# Patient Record
Sex: MALE | Race: WHITE | Employment: FULL TIME | URBAN - NONMETROPOLITAN AREA
[De-identification: names, ages, dates, MRNs, and addresses within clinical notes are randomized per-mention and may not be internally consistent; named-entity substitution may affect disease eponyms.]

---

## 2017-02-06 PROBLEM — M67.441 GANGLION OF FLEXOR TENDON SHEATH OF RIGHT MIDDLE FINGER: Status: ACTIVE | Noted: 2017-02-06

## 2017-02-07 ENCOUNTER — ANESTHESIA (OUTPATIENT)
Dept: OPERATING ROOM | Age: 32
End: 2017-02-07
Payer: COMMERCIAL

## 2017-02-07 ENCOUNTER — HOSPITAL ENCOUNTER (OUTPATIENT)
Age: 32
Setting detail: OUTPATIENT SURGERY
Discharge: HOME | End: 2017-02-07
Attending: ORTHOPAEDIC SURGERY | Admitting: ORTHOPAEDIC SURGERY
Payer: COMMERCIAL

## 2017-02-07 ENCOUNTER — SURGERY (OUTPATIENT)
Age: 32
End: 2017-02-07

## 2017-02-07 ENCOUNTER — ANESTHESIA EVENT (OUTPATIENT)
Dept: OPERATING ROOM | Age: 32
End: 2017-02-07
Payer: COMMERCIAL

## 2017-02-07 VITALS
OXYGEN SATURATION: 100 % | RESPIRATION RATE: 8 BRPM | TEMPERATURE: 98.6 F | SYSTOLIC BLOOD PRESSURE: 83 MMHG | DIASTOLIC BLOOD PRESSURE: 42 MMHG

## 2017-02-07 VITALS
RESPIRATION RATE: 12 BRPM | SYSTOLIC BLOOD PRESSURE: 119 MMHG | DIASTOLIC BLOOD PRESSURE: 68 MMHG | WEIGHT: 190 LBS | BODY MASS INDEX: 27.2 KG/M2 | OXYGEN SATURATION: 96 % | HEIGHT: 70 IN | TEMPERATURE: 97.4 F | HEART RATE: 80 BPM

## 2017-02-07 PROCEDURE — 3700000001 HC ADD 15 MINUTES (ANESTHESIA): Performed by: ORTHOPAEDIC SURGERY

## 2017-02-07 PROCEDURE — 3700000000 HC ANESTHESIA ATTENDED CARE: Performed by: ORTHOPAEDIC SURGERY

## 2017-02-07 PROCEDURE — 6360000002 HC RX W HCPCS: Performed by: ORTHOPAEDIC SURGERY

## 2017-02-07 PROCEDURE — 3600000013 HC SURGERY LEVEL 3 ADDTL 15MIN: Performed by: ORTHOPAEDIC SURGERY

## 2017-02-07 PROCEDURE — 2720000001 HC MISC SURG SUPPLY STERILE $51-500: Performed by: ORTHOPAEDIC SURGERY

## 2017-02-07 PROCEDURE — 2500000003 HC RX 250 WO HCPCS: Performed by: NURSE ANESTHETIST, CERTIFIED REGISTERED

## 2017-02-07 PROCEDURE — 2500000003 HC RX 250 WO HCPCS: Performed by: ORTHOPAEDIC SURGERY

## 2017-02-07 PROCEDURE — 2580000003 HC RX 258: Performed by: ORTHOPAEDIC SURGERY

## 2017-02-07 PROCEDURE — 3600000003 HC SURGERY LEVEL 3 BASE: Performed by: ORTHOPAEDIC SURGERY

## 2017-02-07 PROCEDURE — 88304 TISSUE EXAM BY PATHOLOGIST: CPT

## 2017-02-07 PROCEDURE — 7100000011 HC PHASE II RECOVERY - ADDTL 15 MIN: Performed by: ORTHOPAEDIC SURGERY

## 2017-02-07 PROCEDURE — 7100000010 HC PHASE II RECOVERY - FIRST 15 MIN: Performed by: ORTHOPAEDIC SURGERY

## 2017-02-07 PROCEDURE — 6360000002 HC RX W HCPCS: Performed by: NURSE ANESTHETIST, CERTIFIED REGISTERED

## 2017-02-07 PROCEDURE — 2720000003 HC MISC SUTURE/STAPLES/RELOADS/ETC: Performed by: ORTHOPAEDIC SURGERY

## 2017-02-07 PROCEDURE — 7100000001 HC PACU RECOVERY - ADDTL 15 MIN: Performed by: ORTHOPAEDIC SURGERY

## 2017-02-07 PROCEDURE — 7100000000 HC PACU RECOVERY - FIRST 15 MIN: Performed by: ORTHOPAEDIC SURGERY

## 2017-02-07 RX ORDER — LABETALOL HYDROCHLORIDE 5 MG/ML
5 INJECTION, SOLUTION INTRAVENOUS EVERY 10 MIN PRN
Status: DISCONTINUED | OUTPATIENT
Start: 2017-02-07 | End: 2017-02-07 | Stop reason: HOSPADM

## 2017-02-07 RX ORDER — HYDRALAZINE HYDROCHLORIDE 20 MG/ML
5 INJECTION INTRAMUSCULAR; INTRAVENOUS EVERY 10 MIN PRN
Status: DISCONTINUED | OUTPATIENT
Start: 2017-02-07 | End: 2017-02-07 | Stop reason: HOSPADM

## 2017-02-07 RX ORDER — MORPHINE SULFATE 4 MG/ML
2 INJECTION, SOLUTION INTRAMUSCULAR; INTRAVENOUS EVERY 5 MIN PRN
Status: DISCONTINUED | OUTPATIENT
Start: 2017-02-07 | End: 2017-02-07 | Stop reason: HOSPADM

## 2017-02-07 RX ORDER — MIDAZOLAM HYDROCHLORIDE 1 MG/ML
INJECTION INTRAMUSCULAR; INTRAVENOUS PRN
Status: DISCONTINUED | OUTPATIENT
Start: 2017-02-07 | End: 2017-02-07 | Stop reason: SDUPTHER

## 2017-02-07 RX ORDER — SODIUM CHLORIDE, SODIUM LACTATE, POTASSIUM CHLORIDE, CALCIUM CHLORIDE 600; 310; 30; 20 MG/100ML; MG/100ML; MG/100ML; MG/100ML
INJECTION, SOLUTION INTRAVENOUS CONTINUOUS
Status: DISCONTINUED | OUTPATIENT
Start: 2017-02-07 | End: 2017-02-07 | Stop reason: HOSPADM

## 2017-02-07 RX ORDER — FENTANYL CITRATE 50 UG/ML
INJECTION, SOLUTION INTRAMUSCULAR; INTRAVENOUS PRN
Status: DISCONTINUED | OUTPATIENT
Start: 2017-02-07 | End: 2017-02-07 | Stop reason: SDUPTHER

## 2017-02-07 RX ORDER — MORPHINE SULFATE 4 MG/ML
4 INJECTION, SOLUTION INTRAMUSCULAR; INTRAVENOUS EVERY 5 MIN PRN
Status: DISCONTINUED | OUTPATIENT
Start: 2017-02-07 | End: 2017-02-07 | Stop reason: HOSPADM

## 2017-02-07 RX ORDER — PROPOFOL 10 MG/ML
INJECTION, EMULSION INTRAVENOUS PRN
Status: DISCONTINUED | OUTPATIENT
Start: 2017-02-07 | End: 2017-02-07 | Stop reason: SDUPTHER

## 2017-02-07 RX ORDER — ACETAMINOPHEN AND CODEINE PHOSPHATE 300; 30 MG/1; MG/1
TABLET ORAL
Qty: 15 TABLET | Refills: 0 | Status: SHIPPED | OUTPATIENT
Start: 2017-02-07 | End: 2020-08-22

## 2017-02-07 RX ORDER — LIDOCAINE HYDROCHLORIDE 10 MG/ML
INJECTION, SOLUTION INFILTRATION; PERINEURAL PRN
Status: DISCONTINUED | OUTPATIENT
Start: 2017-02-07 | End: 2017-02-07 | Stop reason: SDUPTHER

## 2017-02-07 RX ORDER — MEPERIDINE HYDROCHLORIDE 25 MG/ML
12.5 INJECTION INTRAMUSCULAR; INTRAVENOUS; SUBCUTANEOUS EVERY 5 MIN PRN
Status: DISCONTINUED | OUTPATIENT
Start: 2017-02-07 | End: 2017-02-07 | Stop reason: HOSPADM

## 2017-02-07 RX ORDER — LIDOCAINE HYDROCHLORIDE 10 MG/ML
1 INJECTION, SOLUTION EPIDURAL; INFILTRATION; INTRACAUDAL; PERINEURAL ONCE
Status: COMPLETED | OUTPATIENT
Start: 2017-02-07 | End: 2017-02-07

## 2017-02-07 RX ORDER — OXYCODONE HYDROCHLORIDE AND ACETAMINOPHEN 5; 325 MG/1; MG/1
2 TABLET ORAL PRN
Status: DISCONTINUED | OUTPATIENT
Start: 2017-02-07 | End: 2017-02-07 | Stop reason: HOSPADM

## 2017-02-07 RX ORDER — PROMETHAZINE HYDROCHLORIDE 25 MG/ML
6.25 INJECTION, SOLUTION INTRAMUSCULAR; INTRAVENOUS
Status: DISCONTINUED | OUTPATIENT
Start: 2017-02-07 | End: 2017-02-07 | Stop reason: HOSPADM

## 2017-02-07 RX ORDER — METOCLOPRAMIDE HYDROCHLORIDE 5 MG/ML
10 INJECTION INTRAMUSCULAR; INTRAVENOUS
Status: DISCONTINUED | OUTPATIENT
Start: 2017-02-07 | End: 2017-02-07 | Stop reason: HOSPADM

## 2017-02-07 RX ORDER — KETOROLAC TROMETHAMINE 30 MG/ML
INJECTION, SOLUTION INTRAMUSCULAR; INTRAVENOUS PRN
Status: DISCONTINUED | OUTPATIENT
Start: 2017-02-07 | End: 2017-02-07 | Stop reason: SDUPTHER

## 2017-02-07 RX ORDER — ONDANSETRON 2 MG/ML
INJECTION INTRAMUSCULAR; INTRAVENOUS PRN
Status: DISCONTINUED | OUTPATIENT
Start: 2017-02-07 | End: 2017-02-07 | Stop reason: SDUPTHER

## 2017-02-07 RX ORDER — DIPHENHYDRAMINE HYDROCHLORIDE 50 MG/ML
12.5 INJECTION INTRAMUSCULAR; INTRAVENOUS
Status: DISCONTINUED | OUTPATIENT
Start: 2017-02-07 | End: 2017-02-07 | Stop reason: HOSPADM

## 2017-02-07 RX ORDER — DEXAMETHASONE SODIUM PHOSPHATE 4 MG/ML
INJECTION, SOLUTION INTRA-ARTICULAR; INTRALESIONAL; INTRAMUSCULAR; INTRAVENOUS; SOFT TISSUE PRN
Status: DISCONTINUED | OUTPATIENT
Start: 2017-02-07 | End: 2017-02-07 | Stop reason: SDUPTHER

## 2017-02-07 RX ORDER — MORPHINE SULFATE 10 MG/ML
INJECTION, SOLUTION INTRAMUSCULAR; INTRAVENOUS PRN
Status: DISCONTINUED | OUTPATIENT
Start: 2017-02-07 | End: 2017-02-07 | Stop reason: SDUPTHER

## 2017-02-07 RX ORDER — OXYCODONE HYDROCHLORIDE AND ACETAMINOPHEN 5; 325 MG/1; MG/1
1 TABLET ORAL PRN
Status: DISCONTINUED | OUTPATIENT
Start: 2017-02-07 | End: 2017-02-07 | Stop reason: HOSPADM

## 2017-02-07 RX ADMIN — LIDOCAINE HYDROCHLORIDE 1 ML: 10 INJECTION, SOLUTION EPIDURAL; INFILTRATION; INTRACAUDAL; PERINEURAL at 09:42

## 2017-02-07 RX ADMIN — LIDOCAINE HYDROCHLORIDE 5 ML: 10 INJECTION, SOLUTION INFILTRATION; PERINEURAL at 10:38

## 2017-02-07 RX ADMIN — ONDANSETRON HYDROCHLORIDE 4 MG: 2 INJECTION, SOLUTION INTRAVENOUS at 10:44

## 2017-02-07 RX ADMIN — MORPHINE SULFATE 5 MG: 10 INJECTION INTRAMUSCULAR; INTRAVENOUS; SUBCUTANEOUS at 10:33

## 2017-02-07 RX ADMIN — DEXAMETHASONE SODIUM PHOSPHATE 4 MG: 4 INJECTION, SOLUTION INTRAMUSCULAR; INTRAVENOUS at 10:44

## 2017-02-07 RX ADMIN — FENTANYL CITRATE 25 MCG: 50 INJECTION INTRAMUSCULAR; INTRAVENOUS at 10:45

## 2017-02-07 RX ADMIN — CEFAZOLIN SODIUM 1 G: 1 INJECTION, SOLUTION INTRAVENOUS at 10:35

## 2017-02-07 RX ADMIN — KETOROLAC TROMETHAMINE 30 MG: 30 INJECTION, SOLUTION INTRAMUSCULAR at 10:58

## 2017-02-07 RX ADMIN — FENTANYL CITRATE 25 MCG: 50 INJECTION INTRAMUSCULAR; INTRAVENOUS at 10:43

## 2017-02-07 RX ADMIN — PROPOFOL 170 MG: 10 INJECTION, EMULSION INTRAVENOUS at 10:38

## 2017-02-07 RX ADMIN — FENTANYL CITRATE 50 MCG: 50 INJECTION INTRAMUSCULAR; INTRAVENOUS at 10:37

## 2017-02-07 RX ADMIN — MORPHINE SULFATE 5 MG: 10 INJECTION INTRAMUSCULAR; INTRAVENOUS; SUBCUTANEOUS at 10:43

## 2017-02-07 RX ADMIN — SODIUM CHLORIDE, SODIUM LACTATE, POTASSIUM CHLORIDE, AND CALCIUM CHLORIDE: 600; 310; 30; 20 INJECTION, SOLUTION INTRAVENOUS at 09:42

## 2017-02-07 RX ADMIN — MIDAZOLAM HYDROCHLORIDE 2 MG: 1 INJECTION, SOLUTION INTRAMUSCULAR; INTRAVENOUS at 10:31

## 2017-02-07 ASSESSMENT — PAIN - FUNCTIONAL ASSESSMENT: PAIN_FUNCTIONAL_ASSESSMENT: 0-10

## 2018-03-07 ENCOUNTER — OFFICE VISIT (OUTPATIENT)
Dept: UROLOGY | Facility: CLINIC | Age: 33
End: 2018-03-07

## 2018-03-07 VITALS — HEIGHT: 70 IN | TEMPERATURE: 97.8 F | BODY MASS INDEX: 28.77 KG/M2 | WEIGHT: 201 LBS

## 2018-03-07 DIAGNOSIS — Z30.09 ENCOUNTER FOR VASECTOMY COUNSELING: Primary | ICD-10-CM

## 2018-03-07 PROCEDURE — 99203 OFFICE O/P NEW LOW 30 MIN: CPT | Performed by: UROLOGY

## 2018-03-07 RX ORDER — HYDROCODONE BITARTRATE AND ACETAMINOPHEN 7.5; 325 MG/1; MG/1
1 TABLET ORAL EVERY 6 HOURS PRN
Qty: 12 TABLET | Refills: 0 | Status: SHIPPED | OUTPATIENT
Start: 2018-03-07 | End: 2018-05-17 | Stop reason: SDUPTHER

## 2018-03-07 RX ORDER — DIAZEPAM 10 MG/1
10 TABLET ORAL 2 TIMES DAILY PRN
Qty: 1 TABLET | Refills: 0 | Status: SHIPPED | OUTPATIENT
Start: 2018-03-07

## 2018-03-07 NOTE — PROGRESS NOTES
Chief Complaint: vasectomy Evaluation    Subjective    Mr. Arredondo is 32 y.o. male    History of Present Illness  Vasectomy Consult  Patient here for pre-vasectomy consultation. He denies hematuria, urinary tract infections, urolithiasis, prostatitis, erectile dysfunction, previous genitourinary surgery, epididymal orchitis, testicular masses. He was given the consent form, pre-vasectomy instruction sheet, and vasectomy booklet. I extensively reviewed with him the likely postoperative recuperative period as well as the need to continue to use contraception until he is notified by us of his sterility. He will have a semen analysis after 20-30 ejaculations. He understands the potential side effects of local anesthesia, bleeding, scrotal hematoma, wound infection, epididymal orchitis, epididymal congestion,  1% risk chronic testicular pain potentially requiring further surgery, sperm granuloma, antisperm antibodies, early recanalization, spontaneous recanalization with pregnancy after demonstration of azoospermia risk of 1 in 2000 and the possible association with prostate cancer. He is aware of alternatives to vasectomy. He has given this careful consideration and wishes to proceed with a vasectomy.     The following portions of the patient's history were reviewed and updated as appropriate: allergies, current medications, past family history, past medical history, past social history, past surgical history and problem list.    Review of Systems   Constitutional: Negative for appetite change, chills, fever and unexpected weight change.   HENT: Negative for congestion, ear pain, facial swelling, hearing loss, nosebleeds, trouble swallowing and voice change.    Eyes: Negative for photophobia, pain, discharge and visual disturbance.   Respiratory: Negative for cough, choking, chest tightness and shortness of breath.    Cardiovascular: Negative for chest pain and palpitations.   Gastrointestinal: Negative for abdominal  "distention, abdominal pain, blood in stool, constipation, diarrhea, nausea and vomiting.   Endocrine: Negative for cold intolerance, heat intolerance and polydipsia.   Genitourinary: Negative for decreased urine volume, difficulty urinating, discharge, dysuria, enuresis, flank pain, frequency, genital sores, hematuria, penile pain, penile swelling, scrotal swelling, testicular pain and urgency.   Musculoskeletal: Negative for arthralgias, joint swelling, neck pain and neck stiffness.   Skin: Negative for pallor and rash.   Allergic/Immunologic: Negative for immunocompromised state.   Neurological: Negative for dizziness, tremors, seizures, syncope, light-headedness and headaches.   Hematological: Negative for adenopathy. Does not bruise/bleed easily.   Psychiatric/Behavioral: Negative for agitation, confusion, dysphoric mood, hallucinations, self-injury and suicidal ideas.         Current Outpatient Prescriptions:   •  diazePAM (VALIUM) 10 MG tablet, Take 1 tablet by mouth 2 (Two) Times a Day As Needed for Anxiety. Take prior to procedure, Disp: 1 tablet, Rfl: 0  •  HYDROcodone-acetaminophen (NORCO) 7.5-325 MG per tablet, Take 1 tablet by mouth Every 6 (Six) Hours As Needed for Moderate Pain ., Disp: 12 tablet, Rfl: 0    History reviewed. No pertinent past medical history.    Past Surgical History:   Procedure Laterality Date   • FINGER SURGERY Right        Social History     Social History   • Marital status:      Social History Main Topics   • Smoking status: Never Smoker   • Smokeless tobacco: Never Used   • Alcohol use No   • Sexual activity: Defer       Family History   Problem Relation Age of Onset   • No Known Problems Father    • No Known Problems Mother        Objective    Temp 97.8 °F (36.6 °C)  Ht 177.8 cm (70\")  Wt 91.2 kg (201 lb)  BMI 28.84 kg/m2    Physical Exam   Constitutional: He is oriented to person, place, and time. He appears well-developed and well-nourished. No distress.   HENT: "   Nose: Nose normal.   Neck: Normal range of motion. Neck supple. No tracheal deviation present. No thyromegaly present.   Cardiovascular: Normal rate, regular rhythm and intact distal pulses.    No significant edema or tenderness    Pulmonary/Chest: Breath sounds normal. No accessory muscle usage. No respiratory distress.   Abdominal: Soft. Bowel sounds are normal. He exhibits no distension, no ascites and no mass. There is no hepatosplenomegaly. There is no tenderness. There is no rebound, no guarding and no CVA tenderness. No hernia.   Stool specimen is not indicated for my portion of the exam   Genitourinary: Testes normal and penis normal. Rectal exam shows no mass, no tenderness, anal tone normal and guaiac negative stool. Tender: no nodules. Right testis shows no mass, no swelling and no tenderness. Left testis shows no mass, no swelling and no tenderness. No penile tenderness (no lesion or deformities).   Genitourinary Comments:  The urethral meatus normal in position without evidence of stricture. Epididymis without mass or tenderness. Vas Deferens is palpably normal.   Lymphadenopathy:     He has no cervical adenopathy. No inguinal adenopathy noted on the right or left side.        Right: No inguinal adenopathy present.        Left: No inguinal adenopathy present.   Neurological: He is alert and oriented to person, place, and time.   Skin: Skin is warm and dry. No rash noted. He is not diaphoretic. No pallor.   Psychiatric: He has a normal mood and affect. His behavior is normal.   Vitals reviewed.          No results found for this or any previous visit.  Assessment and Plan    Diagnoses and all orders for this visit:    Encounter for vasectomy counseling  -     diazePAM (VALIUM) 10 MG tablet; Take 1 tablet by mouth 2 (Two) Times a Day As Needed for Anxiety. Take prior to procedure  -     HYDROcodone-acetaminophen (NORCO) 7.5-325 MG per tablet; Take 1 tablet by mouth Every 6 (Six) Hours As Needed for  Moderate Pain .  -     Vasectomy; Future

## 2018-03-13 ENCOUNTER — TELEPHONE (OUTPATIENT)
Dept: UROLOGY | Facility: CLINIC | Age: 33
End: 2018-03-13

## 2018-03-13 NOTE — TELEPHONE ENCOUNTER
Patient needs to cancel his vasectomy. His wife is going to have surgery and he will need to be well during that time. He said that sometime in May would work for him.

## 2018-04-17 ENCOUNTER — TELEPHONE (OUTPATIENT)
Dept: UROLOGY | Facility: CLINIC | Age: 33
End: 2018-04-17

## 2018-04-19 ENCOUNTER — TRANSCRIBE ORDERS (OUTPATIENT)
Dept: ADMINISTRATIVE | Facility: HOSPITAL | Age: 33
End: 2018-04-19

## 2018-04-19 ENCOUNTER — APPOINTMENT (OUTPATIENT)
Dept: LAB | Facility: HOSPITAL | Age: 33
End: 2018-04-19

## 2018-04-19 DIAGNOSIS — R19.7 DIARRHEA IN ADULT PATIENT: Primary | ICD-10-CM

## 2018-04-19 LAB
ADV 40+41 DNA STL QL NAA+NON-PROBE: NOT DETECTED
ASTRO TYP 1-8 RNA STL QL NAA+NON-PROBE: NOT DETECTED
C CAYETANENSIS DNA STL QL NAA+NON-PROBE: NOT DETECTED
C DIFF TOX GENS STL QL NAA+PROBE: NOT DETECTED
CAMPY SP DNA.DIARRHEA STL QL NAA+PROBE: NOT DETECTED
CRYPTOSP STL CULT: NOT DETECTED
E COLI DNA SPEC QL NAA+PROBE: NOT DETECTED
E HISTOLYT AG STL-ACNC: NOT DETECTED
EAEC PAA PLAS AGGR+AATA ST NAA+NON-PRB: NOT DETECTED
EC STX1+STX2 GENES STL QL NAA+NON-PROBE: NOT DETECTED
EPEC EAE GENE STL QL NAA+NON-PROBE: NOT DETECTED
ETEC LTA+ST1A+ST1B TOX ST NAA+NON-PROBE: NOT DETECTED
G LAMBLIA DNA SPEC QL NAA+PROBE: NOT DETECTED
NOROVIRUS GI+II RNA STL QL NAA+NON-PROBE: NOT DETECTED
P SHIGELLOIDES DNA STL QL NAA+NON-PROBE: NOT DETECTED
RV RNA STL NAA+PROBE: NOT DETECTED
SALMONELLA DNA SPEC QL NAA+PROBE: NOT DETECTED
SAPO I+II+IV+V RNA STL QL NAA+NON-PROBE: NOT DETECTED
SHIGELLA SP+EIEC IPAH ST NAA+NON-PROBE: NOT DETECTED
V CHOLERAE DNA SPEC QL NAA+PROBE: NOT DETECTED
VIBRIO DNA SPEC NAA+PROBE: NOT DETECTED
YERSINIA STL CULT: NOT DETECTED

## 2018-04-19 PROCEDURE — 87507 IADNA-DNA/RNA PROBE TQ 12-25: CPT | Performed by: NURSE PRACTITIONER

## 2018-05-17 ENCOUNTER — OFFICE VISIT (OUTPATIENT)
Dept: UROLOGY | Facility: CLINIC | Age: 33
End: 2018-05-17

## 2018-05-17 DIAGNOSIS — Z30.2 ENCOUNTER FOR VASECTOMY: ICD-10-CM

## 2018-05-17 PROCEDURE — 55250 REMOVAL OF SPERM DUCT(S): CPT | Performed by: UROLOGY

## 2018-05-17 RX ORDER — HYDROCODONE BITARTRATE AND ACETAMINOPHEN 7.5; 325 MG/1; MG/1
1 TABLET ORAL EVERY 6 HOURS PRN
Qty: 12 TABLET | Refills: 0 | Status: SHIPPED | OUTPATIENT
Start: 2018-05-17

## 2018-05-17 NOTE — PROGRESS NOTES
No Scalpel Vasectomy Procedure Note    Indications: 32 y.o. male desiring permanent sterilization    Pre-operative Diagnosis: Undesired fertility    Post-operative Diagnosis: Undesired fertility    Anesthesia: Lidocaine 1% without epinephrine without added sodium bicarbonate    Procedure Details     The risks and benefits of the procedure were discussed at the pre-procedure consultation, and written, informed consent obtained.    Premedicated with Norco 7.5 and Valium 10 mg 30 minutes prior to procedure.    The scrotum was palpated with both testes normal in size and position, no masses palpated. The scrotum was cleansed with warm Betadine and draped in the usual sterile manner.     A vasal sheath block was performed on both the left and right vas.  After adequate anesthesia was established, a small perforation was made in the skin and the right vas was isolated with the ring forceps, dissected free and delivered through the skin perforation.  The right vas was divided, approximately 3 cm portion removed, and each end of the vas was cauterized.  The ends of the vas were replaced in the scrotum through the puncture site.  The left vas was then isolated, divided, cauterized in a similar fashion.  Midportions removed not sent to pathology to confirm because they were grossly normal.     Any bleeding was controlled with electrocautery.  3.0 Chormic interrupted suture was used to close both sites. The puncture site was dry when the procedure was completed. Dressing was applied to both incisions and jock strap placed for scrotal support.    Specimen: None    Condition: Stable    Complications: None    Plan:  1. Continue contraception until negative sperm analysis. Bring 2 semen samples after 20-30 ejaculates  2. Warning signs of infection were reviewed.   3. Patient is taken home by significant other with written home care instructions.  Bedrest X 48 hrs, Ice pack every 3 hours for 24 hrs.    Call the clinic if excessive  pain, bleeding or swelling.

## 2018-07-27 DIAGNOSIS — Z30.2 ENCOUNTER FOR VASECTOMY: Primary | ICD-10-CM

## 2020-08-22 ENCOUNTER — HOSPITAL ENCOUNTER (EMERGENCY)
Age: 35
Discharge: HOME | End: 2020-08-23
Attending: PEDIATRICS
Payer: COMMERCIAL

## 2020-08-22 ENCOUNTER — APPOINTMENT (OUTPATIENT)
Dept: GENERAL RADIOLOGY | Age: 35
End: 2020-08-22
Payer: COMMERCIAL

## 2020-08-22 PROCEDURE — 73610 X-RAY EXAM OF ANKLE: CPT

## 2020-08-22 PROCEDURE — 73130 X-RAY EXAM OF HAND: CPT

## 2020-08-22 PROCEDURE — 99285 EMERGENCY DEPT VISIT HI MDM: CPT

## 2020-08-22 PROCEDURE — 99284 EMERGENCY DEPT VISIT MOD MDM: CPT

## 2020-08-22 ASSESSMENT — PAIN DESCRIPTION - ORIENTATION: ORIENTATION: RIGHT

## 2020-08-22 ASSESSMENT — PAIN DESCRIPTION - LOCATION: LOCATION: ANKLE

## 2020-08-22 ASSESSMENT — PAIN DESCRIPTION - PAIN TYPE: TYPE: ACUTE PAIN

## 2020-08-22 ASSESSMENT — PAIN SCALES - GENERAL: PAINLEVEL_OUTOF10: 8

## 2020-08-23 VITALS
TEMPERATURE: 98.4 F | HEIGHT: 70 IN | OXYGEN SATURATION: 97 % | SYSTOLIC BLOOD PRESSURE: 130 MMHG | RESPIRATION RATE: 20 BRPM | WEIGHT: 200 LBS | BODY MASS INDEX: 28.63 KG/M2 | HEART RATE: 70 BPM | DIASTOLIC BLOOD PRESSURE: 84 MMHG

## 2020-08-23 PROCEDURE — 6370000000 HC RX 637 (ALT 250 FOR IP): Performed by: PEDIATRICS

## 2020-08-23 RX ORDER — HYDROCODONE BITARTRATE AND ACETAMINOPHEN 7.5; 325 MG/1; MG/1
1 TABLET ORAL ONCE
Status: COMPLETED | OUTPATIENT
Start: 2020-08-23 | End: 2020-08-23

## 2020-08-23 RX ORDER — HYDROCODONE BITARTRATE AND ACETAMINOPHEN 7.5; 325 MG/1; MG/1
1 TABLET ORAL EVERY 6 HOURS PRN
Qty: 12 TABLET | Refills: 0 | Status: SHIPPED | OUTPATIENT
Start: 2020-08-23 | End: 2020-08-26

## 2020-08-23 RX ORDER — HYDROCODONE BITARTRATE AND ACETAMINOPHEN 10; 325 MG/1; MG/1
1 TABLET ORAL ONCE
Status: DISCONTINUED | OUTPATIENT
Start: 2020-08-23 | End: 2020-08-23

## 2020-08-23 RX ORDER — IBUPROFEN 800 MG/1
800 TABLET ORAL EVERY 6 HOURS PRN
Qty: 20 TABLET | Refills: 0 | Status: SHIPPED | OUTPATIENT
Start: 2020-08-23

## 2020-08-23 RX ADMIN — HYDROCODONE BITARTRATE AND ACETAMINOPHEN 1 TABLET: 7.5; 325 TABLET ORAL at 02:49

## 2020-08-23 ASSESSMENT — ENCOUNTER SYMPTOMS
VOMITING: 0
BACK PAIN: 0
SHORTNESS OF BREATH: 0
COLOR CHANGE: 0
RHINORRHEA: 0
COUGH: 0
EYE DISCHARGE: 0
ABDOMINAL PAIN: 0
NAUSEA: 0

## 2020-08-23 ASSESSMENT — PAIN SCALES - GENERAL: PAINLEVEL_OUTOF10: 8

## 2020-08-23 NOTE — ED NOTES
Dr. Briseida Jamison at bedside.         Rohini George, RN  08/23/20 . Luis Felipe Astorga RN  08/23/20 1825

## 2020-08-24 NOTE — ED PROVIDER NOTES
[facility]  eMERGENCY dEPARTMENT eNCOUnter      Pt Name: Evon Corley  MRN: [id number]  [place] 1985  Date of evaluation: 8/22/2020  Provider: Viktoria Sood MD    CHIEF COMPLAINT       Chief Complaint   Patient presents with    Fall     fall while jogging, right ankle deformity and swelling. Limited ROM of left thumb.  Ankle Pain     right ankle pain. HISTORY OF PRESENT ILLNESS   (Location/Symptom, Timing/Onset,Context/Setting, Quality, Duration, Modifying Factors, Severity)  Note limiting factors. Evon Corley is a 38 y.o. male who presents to the emergency department after falling while running. Patient states that he was running when he fell and hurt his right ankle and both hands. Patient states that he was unable to rise and walk on the ankle. Patient states he also has pain overlying the DIP joint of his left thumb. Patient denies head injury or loss of consciousness. Tetanus status is uncertain. HPI    NursingNotes were reviewed. REVIEW OF SYSTEMS    (2-9 systems for level 4, 10 or more for level 5)     Review of Systems   Constitutional: Negative for chills and fever. HENT: Negative for congestion and rhinorrhea. Eyes: Negative for discharge. Respiratory: Negative for cough and shortness of breath. Cardiovascular: Negative for chest pain and palpitations. Gastrointestinal: Negative for abdominal pain, nausea and vomiting. Genitourinary: Negative for difficulty urinating and dysuria. Musculoskeletal: Positive for joint swelling (Left thumb and right ankle). Negative for back pain and neck pain. Skin: Negative for color change and pallor. Neurological: Negative for syncope and light-headedness. Psychiatric/Behavioral: Negative for agitation and decreased concentration. All other systems reviewed and are negative. PAST MEDICALHISTORY   History reviewed. No pertinent past medical history.       SURGICAL HISTORY       Past °C) Oral 91 16 95 % 5' 10\" (1.778 m) 200 lb (90.7 kg)       Physical Exam  Vitals signs and nursing note reviewed. Constitutional:       General: He is not in acute distress. Appearance: Normal appearance. HENT:      Head: Normocephalic and atraumatic. Right Ear: External ear normal.      Left Ear: External ear normal.      Nose: Nose normal.      Mouth/Throat:      Mouth: Mucous membranes are moist.      Pharynx: Oropharynx is clear. No oropharyngeal exudate. Eyes:      General: No scleral icterus. Conjunctiva/sclera: Conjunctivae normal.      Pupils: Pupils are equal, round, and reactive to light. Neck:      Musculoskeletal: Neck supple. No neck rigidity. Cardiovascular:      Rate and Rhythm: Normal rate and regular rhythm. Pulses: Normal pulses. Heart sounds: Normal heart sounds. Pulmonary:      Effort: Pulmonary effort is normal.      Breath sounds: Normal breath sounds. Abdominal:      General: Bowel sounds are normal.      Palpations: Abdomen is soft. Tenderness: There is no abdominal tenderness. There is no guarding. Musculoskeletal:         General: Signs of injury present. No tenderness or deformity. Comments: Patient has significant swelling overlying the lateral malleolus of right ankle. This area is tender to palpation notably distal to the lateral malleolus. There is no crepitance. Diminished range of motion secondary to pain. Neurovascularly intact distally. Bilateral hands  Multiple abrasions and contusions. Left hand shows ecchymosis all along the left thumb with pain and diminished range of motion at the DIP joint. Small subungual hematoma- less than 1%. Neurovascularly intact distally. Skin:     General: Skin is warm and dry. Capillary Refill: Capillary refill takes less than 2 seconds. Coloration: Skin is not jaundiced. Neurological:      General: No focal deficit present.       Mental Status: He is alert and oriented to person, place, and time. Mental status is at baseline. Coordination: Coordination normal.   Psychiatric:         Mood and Affect: Mood normal.         Behavior: Behavior normal.         DIAGNOSTIC RESULTS     RADIOLOGY:  Non-plain film images such as CT, Ultrasound and MRI are read by the radiologist. Plain radiographic images are visualized and preliminarily interpreted bythe emergency physician with the below findings:      XR ANKLE RIGHT (MIN 3 VIEWS)   Final Result   1. Prominent lateral soft tissue swelling with no acute osseous   pathology of the right ankle. Signed by Dr Teresita Lynne on 8/23/2020 8:29 AM      XR HAND LEFT (MIN 3 VIEWS)   Final Result   1. No acute osseous pathology of the left hand. Signed by Dr Teresita Lynne on 8/23/2020 8:30 AM      XR HAND RIGHT (MIN 3 VIEWS)   Final Result   1. Mild soft tissue swelling adjacent the PIP joint of the right   fourth/ring finger with no fracture or dislocation. Signed by Dr Teresita Lynne on 8/23/2020 8:31 AM              LABS:  Labs Reviewed - No data to display    All other labs were within normal range or not returned as of this dictation. EMERGENCY DEPARTMENT COURSE and DIFFERENTIAL DIAGNOSIS/MDM:   Vitals:    Vitals:    08/22/20 2347 08/22/20 2349 08/23/20 0141 08/23/20 0439   BP:  (!) 182/103 (!) 166/110 130/84   Pulse:  91 80 70   Resp:  16 20 20   Temp:   98.4 °F (36.9 °C)    TempSrc:   Oral    SpO2:  95% 99% 97%   Weight: 200 lb (90.7 kg)      Height: 5' 10\" (1.778 m)          MDM  24-year-old male presents status post fall while running with pain in the right ankle and bilateral hands left greater than right. Radiology results reviewed. Patient placed in posterior splint and provided crutches and crutch training. AlumaFoam splint applied to left thumb. Tetanus updated. Patient will follow-up with Dr. Antolin Madera, orthopedic surgery, this week. He will return with increasing or severe pain or other concerns.   Patient will rest, ice, and elevate extremities. Patient verbalizes understanding and agreement with plan of care. CONSULTS:  None    PROCEDURES:  Unless otherwise noted below, none     Procedures    FINAL IMPRESSION      1. Sprain of right ankle, unspecified ligament, initial encounter    2. Contusion of left hand, initial encounter    3. Abrasions of multiple sites    4. Fall, initial encounter          DISPOSITION/PLAN   DISPOSITION Decision To Discharge 08/23/2020 04:17:36 AM      PATIENT REFERRED TO:  Woo Fan MD  [address] [place] [address]  [phone]    Schedule an appointment as soon as possible for a visit         DISCHARGE MEDICATIONS:  Discharge Medication List as of 8/23/2020  4:22 AM      START taking these medications    Details   HYDROcodone-acetaminophen (LORCET PLUS) 7.5-325 MG per tablet Take 1 tablet by mouth every 6 hours as needed for Pain for up to 3 days. May cause nausea or drowsiness. Do not take and drive., [id number]      ibuprofen (ADVIL;MOTRIN) 800 MG tablet Take 1 tablet by mouth every 6 hours as needed for Pain Take with food. , Disp-20 tablet,R-0Print                (Please note that portions of this note were completed with a voice recognition program.  Efforts were made to edit thedictations but occasionally words are mis-transcribed.)    Corbin Dominguez MD (electronically signed)  Attending Emergency Physician          Corbin Dominguez MD  08/23/20 7829

## 2022-08-29 ENCOUNTER — OFFICE VISIT (OUTPATIENT)
Dept: OTOLARYNGOLOGY | Facility: CLINIC | Age: 37
End: 2022-08-29

## 2022-08-29 VITALS
SYSTOLIC BLOOD PRESSURE: 132 MMHG | WEIGHT: 193 LBS | BODY MASS INDEX: 27.63 KG/M2 | HEART RATE: 82 BPM | HEIGHT: 70 IN | TEMPERATURE: 98.2 F | RESPIRATION RATE: 16 BRPM | DIASTOLIC BLOOD PRESSURE: 82 MMHG

## 2022-08-29 DIAGNOSIS — H93.13 TINNITUS OF BOTH EARS: Primary | ICD-10-CM

## 2022-08-29 DIAGNOSIS — U09.9 LONG COVID: ICD-10-CM

## 2022-08-29 DIAGNOSIS — H91.92 DECREASED HEARING OF LEFT EAR: ICD-10-CM

## 2022-08-29 PROCEDURE — 99203 OFFICE O/P NEW LOW 30 MIN: CPT | Performed by: NURSE PRACTITIONER

## 2022-08-29 RX ORDER — AZELASTINE 1 MG/ML
2 SPRAY, METERED NASAL 2 TIMES DAILY
Qty: 30 ML | Refills: 11 | Status: SHIPPED | OUTPATIENT
Start: 2022-08-29

## 2022-08-29 RX ORDER — FLUTICASONE PROPIONATE 50 MCG
2 SPRAY, SUSPENSION (ML) NASAL DAILY
Qty: 16 G | Refills: 11 | Status: SHIPPED | OUTPATIENT
Start: 2022-08-29

## 2022-08-29 RX ORDER — AMLODIPINE BESYLATE AND BENAZEPRIL HYDROCHLORIDE 5; 20 MG/1; MG/1
CAPSULE ORAL
COMMUNITY
Start: 2022-08-01

## 2022-08-29 NOTE — PROGRESS NOTES
"YOB: 1985  Location: [facility]  Location Address: [address] [place] [address], [facility], [address] [place] [address]  Location Phone: [phone]    Chief Complaint   Patient presents with   • Tinnitus     8 months        History of Present Illness  Mauro Arredondo is a 37 y.o. male.  Mauro Arredondo is here for evaluation of ENT complaints. The patient has had problems with tinnitus and decreased hearing.  The symptoms are localized to the left ear. The patient has had mild to moderate symptoms. The symptoms have been present for the last several months. There have been no identified factors that aggravate the symptoms. There have been no factors that have improved the symptoms.    Patient reports that he had covid in 2021 and feels that the tinnitus started after this. He reports the tinnitus as a \"low pitched whooshing\" sound.     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 He states that he had a hearing test done [facility] ago [facility] and does not recall any significant findings.    EXTERNAL MEDICAL RECORDS - SCAN - [facility]      History reviewed. No pertinent past medical history.    Past Surgical History:   Procedure Laterality Date   • FINGER SURGERY Right        Outpatient Medications Marked as Taking for the 22 encounter (Office Visit) with Humberto Aguilar APRN   Medication Sig Dispense Refill   • amLODIPine-benazepril (LOTREL 5-20) 5-20 MG per capsule          Patient has no known allergies.    Family History   Problem Relation Age of Onset   • No Known Problems Father    • No Known Problems Mother        Social History     Socioeconomic History   • Marital status:    Tobacco Use   • Smoking status: Never Smoker   • Smokeless tobacco: Never Used   Vaping Use   • Vaping Use: Never used   Substance and Sexual Activity   • Alcohol use: No   • Drug use: Never   • Sexual activity: Defer "       Review of Systems   Constitutional: Negative.    HENT: Positive for hearing loss and tinnitus. Negative for ear discharge and ear pain.    Respiratory: Negative.    Cardiovascular: Negative.    Gastrointestinal: Negative.    Genitourinary: Negative.    Musculoskeletal: Negative.    Skin: Negative.    Neurological: Negative.        Vitals:    08/29/22 1513   BP: 132/82   Pulse: 82   Resp: 16   Temp: 98.2 °F (36.8 °C)       Body mass index is 27.69 kg/m².    Objective     Physical Exam  Vitals reviewed.   HENT:      Head: Normocephalic and atraumatic.      Right Ear: Hearing, tympanic membrane, ear canal and external ear normal.      Left Ear: Tympanic membrane, ear canal and external ear normal. Decreased hearing noted.      Nose: Nose normal.      Mouth/Throat:      Lips: Pink.      Mouth: Mucous membranes are moist.   Neurological:      Mental Status: He is alert.   Psychiatric:         Behavior: Behavior is cooperative.         Assessment & Plan   Diagnoses and all orders for this visit:    1. Tinnitus of both ears (Primary)  -     Cancel: Comprehensive Hearing Test; Future  -     MRI Brain With & Without Contrast  -     MRI Internal Auditory Canal With Wo    2. Long COVID    3. Decreased hearing of left ear    Other orders  -     azelastine (ASTELIN) 0.1 % nasal spray; 2 sprays into the nostril(s) as directed by provider 2 (Two) Times a Day. Use in each nostril as directed  Dispense: 30 mL; Refill: 11  -     fluticasone (FLONASE) 50 MCG/ACT nasal spray; 2 sprays into the nostril(s) as directed by provider Daily.  Dispense: 16 g; Refill: 11      * Surgery not found *  Orders Placed This Encounter   Procedures   • MRI Brain With & Without Contrast   • MRI Internal Auditory Canal With Wo     Use nasal sprays as directed  Will obtain MRI of brain and IAC, patient requesting due to asymmetrical hearing  Will obtain hearing test records from [facility]iology group    Return in about 6 weeks (around 10/10/2022) for  Recheck, MRI.       Patient Instructions   For the best response, use your nasal sprays every day without skipping doses. It may take several weeks before the full effect is acheived.      HEARING LOSS       Hearing loss is the third most common health condition in the [place] affecting more than 1 of every 10 people.  This means that over 28 million Americans suffer from hearing loss.  The condition varies with age: 1 out of every 25 children, 1 out of every 14 aged 14-44 years old, 1 out of every 7 adults aged 45-65 year old, and 1 out of every 3 adults over the age of 65 years old.    The ear works by receiving sound vibrations, amplifying the sound, and then converting the mechanical vibration into an electrical signal that is sent to the hearing center of the brain.  The ear has three basic parts, each with very specific functions:  The External Ear   This is made up of the parts of the ear you can see (the auricle), and the ear canal.  These structures function to funnel the sound vibrations down into the ear so that they can be processed.  The Middle Ear  The external ear and the middle ear are  by the eardrum (tympanic membrane).  The middle ear is an air-filled space that houses the middle ear bones (ossicles).  Sound waves hit the eardrum and cause it to vibrate.  The vibrations are then transferred to the ear bones that amplify the sound and transfer it to the inner ear (cochlea).  The Inner Ear  The inner ear is a snail-shaped bone that contains a fluid-filled membrane inside another fluid-filled membrane.  It acts like a battery to transform the mechanical vibrations into and electrical signal.  It does this with very delicate hair cells that rest on top of the inner ear membranes.  The electrical signal is then shipped out of the inner ear to the brain where it is processed and understood as sound.    Disease in any one of these parts of the ear can cause hearing loss, but can do so in  two different ways.  When there is a problem with the process of bringing the sound to the inner ear (i.e. problems in the external or middle ear) it is called a conductive hearing loss.  Problems in the inner ear or nerves of hearing care called sensorineural hearing losses.  Often times, however, there is a combination of the types of hearing losses or a mixed hearing loss.  Conductive Hearing Loss  Impairment of the transfer of sound into the inner ear because of problems with the external ear, the eardrum or the middle ear can cause conductive hearing loss.  Conductive losses can often be lessened or cured with medication or surgery, depending on the etiology.  Causes of Conductive Hearing Losses:  • Wax occlusion of the ear canal  • Eardrum perforations  • Stiffened or scarred eardrums  • Fluid in the middle earspace (otitis media with effusion)  • Middle ear infections (acute otitis media)  • Scarring or fixation of the ear bones (tympanosclerosis, otosclerosis)  • Dislocation of the ear bones  • Cholesteatoma: this is a “skin cyst” that develops due to severely retracted eardrums or from skin growth into the middle ear from a chronic eardrum perforation.  Over time the cyst can grow and erode the bones of hearing.  • Middle ear tumors or masses  Sensorineural Hearing Loss  Deficits in hearing due to problems in the inner ear or nerves of hearing are termed sensorineural losses.  These are usually due to damage to the microscopic hair cells in the cochlea, or due to loss of the nerve cells in the hearing nerve.  For the most part, this type of hearing loss cannot be repaired with medication or surgery, except in rare instances.  Often times, however, it can be helped with hearing aids and rarely with cochlear implantation.  This type of loss can also be associated with tinnitus (ringing of the ears) and vertigo (dizziness).  Causes of Sensorineural Hearing Losses:  • Damage from noise exposure  • Aging:  this  is often a slow, progressive loss of the high frequencies  • Infection (labrynthitis)  • Secondary loss from the effects of meningitis  • Genetic/familial related hearing loss  • Autoimmune hearing loss (a rheumatoid-like process)  • Temporal bond fracture (severe fracture of the bone around or through the inner ear)  • Medication induced damage (chemotherapy, high dose IV antibiotics, diuretics)  • Inner ear and skull base tumors (acoustic neuroma, memingioma)        Specific Causes    Ear Infections and Effusion in Children  Any time the middle ear is filled with fluid, as in an active infection or in middle ear effusion, there can be a conductive hearing.  Ear infections are the most common cause of infant hearing loss and are a very treatable type of hearing loss.  Usually the middle ear is filled with air, which allows the bones of hearing to freely move.  When there is fluid in this space it slows the vibration and is literally like trying to hear underwater.  Most of the time this infection or fluid can be treated with medication.  If this does not happen, often myringotomy tube placement can allow for drainage of fluid and allow the middle ear space to remain ventilated.  Congenital Hearing Loss  Hearing loss is the most common birth defect, affecting 3 in every 1000 children born in the [place].  If untreated, this can lead to significant problems in speech, language, and learning.  Recently, most hospitals have early infant screening that can identify newborns with hearing problems.  With accurate diagnosis, these children can often be treated with hearing aids and other forms of treatment that can allow them to develop normal speech and learning.  Noise Induced Hearing Loss  According to the National Institutes of Health, approximately 10 million Americans have hearing losses that are a least partially attributable to loud noise exposure.  Exposures that can cause permanent damage vary, but short  exposures of very loud quality (gunfire) can be as bad as longer exposures at lower levels.  Most conversations are at about 65-70 decibels.  Levels over 85dB, with exposures of up to 8 hours a day, will produce permanent hearing loss after many years of exposure.  A stereo headset turned up full blast (about 110 dB) can cause a significant hearing loss in less than a half an hour.  These losses are due to damage of the hair cells of the inner ear from the excessive vibrational acoustic trauma of the loud noise.  Since this loss is usually nonreversible, hearing protection is essential.  Foam earplugs can provide 15-30dB of noise protection.  Age Related Hearing Loss (Presbyacusis)  Age related hearing loss is another very common form of hearing loss in the elderly.  It is usually a slowly progressive, high frequency sensorineural loss that in nonreversible.  Not all elderly people will have hearing loss as this is very much related to the genetic makeup of the individual.  This can cause the patient to withdraw from social situations, or cause them to seem to have a mood change because of the frustration of not being able to hear a conversation or having to ask people to repeat things.  Most of the time, this can be well treated by amplifying sounds with a hearing aid.    Hearing loss can have a large impact in the way we function on a day to day basis with our environment.  We are fortunate that most hearing loss can be treated by hearing aids or medical and surgical treatments.  If you think you may be suffering from hearing loss, an evaluation by our doctor can help identify the cause and the specific treatment that can help improve your hearing.     TINNITUS  Tinnitus (latin for ringing) is the sensation of noise in the ears. This symptom can be quite variable and disconcerting. Most people have had ringing in the ears but most do not require treatment. Only 6% of people have ringing troubling enough to seek  treatment.    Symptoms can range from ringing to “noise” of any sort in the ear or ears. Timing can vary as well. There are no specific symptom requirements to have tinnitus. It is speculated that the inner ear hair cells (responsible for hearing) may be dying and causing the brain to seek additional input for sound that is missing. There are many theories for the etiology of tinnitus or ringing.    You may be referred for hearing testing or imaging of the ears/brain to try to determine what is causing ringing and how to best treat the condition.    Tinnitus Recommendations-  >Avoid loud or sudden noise  >Wear hearing protection in the presence of loud noise  >Avoid irritants like caffeine, nicotine, tonic water, malaria medications, alcohol, aspirin- please tell MD if you are taking any of these medications  >In a quiet environment or while sleeping, use a white noise generator or radio station to provide background noise  >Relaxation and stress management techniques are useful  >Biofeedback  >Complementary medicine may be of benefit  >Wear a hearing aid or tinnitus masker/retrainer  >Psychological counseling may be beneficial in some situations  >Exercise!!  >Restorative Sleep!!    Medical therapy for ringing (may include)-  Do nothing  Medications for ringing  IV medication  Ear perfusion- inner ear surgery to reduce ringing  Hearing Aids, Tinnitus maskers, Tinnitus retrainers  Biofeedback  Psychological counseling    All options will be reviewed at your visit. Treating tinnitus can be difficult and frustrating. There really is no cure but rather control. This can be time consuming to treat. The patient determines how much treatment is warranted if there are no associated medical conditions requiring therapy. Please be patient.       CONTACT INFORMATION:  The main office phone number is [phone]. For emergencies after hours and on weekends, this number will convert over to our answering service and the on  call provider will answer. Please try to keep non emergent phone calls/ questions to office hours 9am-5pm Monday through Friday.      iMER  As an alternative, you can sign up and use the Epic MyChart system for more direct and quicker access for non emergent questions/ problems.  [facility] allows you to send messages to your doctor, view your test results, renew your prescriptions, schedule appointments, and more. To sign up, go to [employer] and click on the Sign Up Now link in the New User? box. Enter your iMER Activation Code exactly as it appears below along with the last four digits of your Social Security Number and your Date of Birth () to complete the sign-up process. If you do not sign up before the expiration date, you must request a new code.     iMER Activation Code: Activation code not generated  Current iMER Status: Active     If you have questions, you can email [email or web address] or call [phone] to talk to our iMER staff. Remember, iMER is NOT to be used for urgent needs. For medical emergencies, dial 911.     IF YOU SMOKE OR USE TOBACCO PLEASE READ THE FOLLOWING:  Why is smoking bad for me?  Smoking increases the risk of heart disease, lung disease, vascular disease, stroke, and cancer. If you smoke, STOP!        IF YOU SMOKE OR USE TOBACCO PLEASE READ THE FOLLOWING:  Why is smoking bad for me?  Smoking increases the risk of heart disease, lung disease, vascular disease, stroke, and cancer. If you smoke, STOP!     For more information:  Quit Now [facility]  -QUIT-NOW  [email or web address]

## 2022-08-29 NOTE — PATIENT INSTRUCTIONS
For the best response, use your nasal sprays every day without skipping doses. It may take several weeks before the full effect is acheived.      HEARING LOSS       Hearing loss is the third most common health condition in the [place] affecting more than 1 of every 10 people.  This means that over 28 million [place] suffer from hearing loss.  The condition varies with age: 1 out of every 25 children, 1 out of every 14 aged 14-44 years old, 1 out of every 7 adults aged 45-65 year old, and 1 out of every 3 adults over the age of 65 years old.    The ear works by receiving sound vibrations, amplifying the sound, and then converting the mechanical vibration into an electrical signal that is sent to the hearing center of the brain.  The ear has three basic parts, each with very specific functions:  The External Ear   This is made up of the parts of the ear you can see (the auricle), and the ear canal.  These structures function to funnel the sound vibrations down into the ear so that they can be processed.  The Middle Ear  The external ear and the middle ear are  by the eardrum (tympanic membrane).  The middle ear is an air-filled space that houses the middle ear bones (ossicles).  Sound waves hit the eardrum and cause it to vibrate.  The vibrations are then transferred to the ear bones that amplify the sound and transfer it to the inner ear (cochlea).  The Inner Ear  The inner ear is a snail-shaped bone that contains a fluid-filled membrane inside another fluid-filled membrane.  It acts like a battery to transform the mechanical vibrations into and electrical signal.  It does this with very delicate hair cells that rest on top of the inner ear membranes.  The electrical signal is then shipped out of the inner ear to the brain where it is processed and understood as sound.    Disease in any one of these parts of the ear can cause hearing loss, but can do so in two different ways.  When there is a problem  with the process of bringing the sound to the inner ear (i.e. problems in the external or middle ear) it is called a conductive hearing loss.  Problems in the inner ear or nerves of hearing care called sensorineural hearing losses.  Often times, however, there is a combination of the types of hearing losses or a mixed hearing loss.  Conductive Hearing Loss  Impairment of the transfer of sound into the inner ear because of problems with the external ear, the eardrum or the middle ear can cause conductive hearing loss.  Conductive losses can often be lessened or cured with medication or surgery, depending on the etiology.  Causes of Conductive Hearing Losses:  Wax occlusion of the ear canal  Eardrum perforations  Stiffened or scarred eardrums  Fluid in the middle earspace (otitis media with effusion)  Middle ear infections (acute otitis media)  Scarring or fixation of the ear bones (tympanosclerosis, otosclerosis)  Dislocation of the ear bones  Cholesteatoma: this is a “skin cyst” that develops due to severely retracted eardrums or from skin growth into the middle ear from a chronic eardrum perforation.  Over time the cyst can grow and erode the bones of hearing.  Middle ear tumors or masses  Sensorineural Hearing Loss  Deficits in hearing due to problems in the inner ear or nerves of hearing are termed sensorineural losses.  These are usually due to damage to the microscopic hair cells in the cochlea, or due to loss of the nerve cells in the hearing nerve.  For the most part, this type of hearing loss cannot be repaired with medication or surgery, except in rare instances.  Often times, however, it can be helped with hearing aids and rarely with cochlear implantation.  This type of loss can also be associated with tinnitus (ringing of the ears) and vertigo (dizziness).  Causes of Sensorineural Hearing Losses:  Damage from noise exposure  Aging:  this is often a slow, progressive loss of the high  frequencies  Infection (labrynthitis)  Secondary loss from the effects of meningitis  Genetic/familial related hearing loss  Autoimmune hearing loss (a rheumatoid-like process)  Temporal bond fracture (severe fracture of the bone around or through the inner ear)  Medication induced damage (chemotherapy, high dose IV antibiotics, diuretics)  Inner ear and skull base tumors (acoustic neuroma, memingioma)        Specific Causes    Ear Infections and Effusion in Children  Any time the middle ear is filled with fluid, as in an active infection or in middle ear effusion, there can be a conductive hearing.  Ear infections are the most common cause of infant hearing loss and are a very treatable type of hearing loss.  Usually the middle ear is filled with air, which allows the bones of hearing to freely move.  When there is fluid in this space it slows the vibration and is literally like trying to hear underwater.  Most of the time this infection or fluid can be treated with medication.  If this does not happen, often myringotomy tube placement can allow for drainage of fluid and allow the middle ear space to remain ventilated.  Congenital Hearing Loss  Hearing loss is the most common birth defect, affecting 3 in every 1000 children born in the [place].  If untreated, this can lead to significant problems in speech, language, and learning.  Recently, most hospitals have early infant screening that can identify newborns with hearing problems.  With accurate diagnosis, these children can often be treated with hearing aids and other forms of treatment that can allow them to develop normal speech and learning.  Noise Induced Hearing Loss  According to the National [employer] of [employer], approximately 10 million Americans have hearing losses that are a least partially attributable to loud noise exposure.  Exposures that can cause permanent damage vary, but short exposures of very loud quality (gunfire) can be as bad as  longer exposures at lower levels.  Most conversations are at about 65-70 decibels.  Levels over 85dB, with exposures of up to 8 hours a day, will produce permanent hearing loss after many years of exposure.  A stereo headset turned up full blast (about 110 dB) can cause a significant hearing loss in less than a half an hour.  These losses are due to damage of the hair cells of the inner ear from the excessive vibrational acoustic trauma of the loud noise.  Since this loss is usually nonreversible, hearing protection is essential.  Foam earplugs can provide 15-30dB of noise protection.  Age Related Hearing Loss (Presbyacusis)  Age related hearing loss is another very common form of hearing loss in the elderly.  It is usually a slowly progressive, high frequency sensorineural loss that in nonreversible.  Not all elderly people will have hearing loss as this is very much related to the genetic makeup of the individual.  This can cause the patient to withdraw from social situations, or cause them to seem to have a mood change because of the frustration of not being able to hear a conversation or having to ask people to repeat things.  Most of the time, this can be well treated by amplifying sounds with a hearing aid.    Hearing loss can have a large impact in the way we function on a day to day basis with our environment.  We are fortunate that most hearing loss can be treated by hearing aids or medical and surgical treatments.  If you think you may be suffering from hearing loss, an evaluation by our doctor can help identify the cause and the specific treatment that can help improve your hearing.     TINNITUS  Tinnitus (latin for ringing) is the sensation of noise in the ears. This symptom can be quite variable and disconcerting. Most people have had ringing in the ears but most do not require treatment. Only 6% of people have ringing troubling enough to seek treatment.    Symptoms can range from ringing to “noise” of  any sort in the ear or ears. Timing can vary as well. There are no specific symptom requirements to have tinnitus. It is speculated that the inner ear hair cells (responsible for hearing) may be dying and causing the brain to seek additional input for sound that is missing. There are many theories for the etiology of tinnitus or ringing.    You may be referred for hearing testing or imaging of the ears/brain to try to determine what is causing ringing and how to best treat the condition.    Tinnitus Recommendations-  >Avoid loud or sudden noise  >Wear hearing protection in the presence of loud noise  >Avoid irritants like caffeine, nicotine, tonic water, malaria medications, alcohol, aspirin- please tell MD if you are taking any of these medications  >In a quiet environment or while sleeping, use a white noise generator or radio station to provide background noise  >Relaxation and stress management techniques are useful  >Biofeedback  >Complementary medicine may be of benefit  >Wear a hearing aid or tinnitus masker/retrainer  >Psychological counseling may be beneficial in some situations  >Exercise!!  >Restorative Sleep!!    Medical therapy for ringing (may include)-  Do nothing  Medications for ringing  IV medication  Ear perfusion- inner ear surgery to reduce ringing  Hearing Aids, Tinnitus maskers, Tinnitus retrainers  Biofeedback  Psychological counseling    All options will be reviewed at your visit. Treating tinnitus can be difficult and frustrating. There really is no cure but rather control. This can be time consuming to treat. The patient determines how much treatment is warranted if there are no associated medical conditions requiring therapy. Please be patient.       CONTACT INFORMATION:  The main office phone number is [phone]. For emergencies after hours and on weekends, this number will convert over to our answering service and the on call provider will answer. Please try to keep non emergent  phone calls/ questions to office hours 9am-5pm Monday through Friday.      myVBO  As an alternative, you can sign up and use the Epic MyChart system for more direct and quicker access for non emergent questions/ problems.  [facility] allows you to send messages to your doctor, view your test results, renew your prescriptions, schedule appointments, and more. To sign up, go to [facility] and click on the Sign Up Now link in the New User? box. Enter your myVBO Activation Code exactly as it appears below along with the last four digits of your Social Security Number and your Date of Birth () to complete the sign-up process. If you do not sign up before the expiration date, you must request a new code.     myVBO Activation Code: Activation code not generated  Current myVBO Status: Active     If you have questions, you can email [email or web address] or call [phone] to talk to our myVBO staff. Remember, myVBO is NOT to be used for urgent needs. For medical emergencies, dial 911.     IF YOU SMOKE OR USE TOBACCO PLEASE READ THE FOLLOWING:  Why is smoking bad for me?  Smoking increases the risk of heart disease, lung disease, vascular disease, stroke, and cancer. If you smoke, STOP!        IF YOU SMOKE OR USE TOBACCO PLEASE READ THE FOLLOWING:  Why is smoking bad for me?  Smoking increases the risk of heart disease, lung disease, vascular disease, stroke, and cancer. If you smoke, STOP!     For more information:  Quit Now [place]  -QUIT-NOW  [email or web address]

## 2022-09-26 ENCOUNTER — HOSPITAL ENCOUNTER (OUTPATIENT)
Dept: MRI IMAGING | Facility: HOSPITAL | Age: 37
Discharge: HOME | End: 2022-09-26

## 2022-09-26 PROCEDURE — 70553 MRI BRAIN STEM W/O & W/DYE: CPT

## 2022-09-26 PROCEDURE — 0 GADOBENATE DIMEGLUMINE 529 MG/ML SOLUTION: Performed by: NURSE PRACTITIONER

## 2022-09-26 PROCEDURE — 82565 ASSAY OF CREATININE: CPT

## 2022-09-26 PROCEDURE — A9577 INJ MULTIHANCE: HCPCS | Performed by: NURSE PRACTITIONER

## 2022-09-26 RX ADMIN — GADOBENATE DIMEGLUMINE 17 ML: 529 INJECTION, SOLUTION INTRAVENOUS at 08:13

## 2022-09-28 LAB — CREAT BLDA-MCNC: 1.1 MG/DL (ref 0.6–1.3)

## 2022-09-30 ENCOUNTER — TELEPHONE (OUTPATIENT)
Dept: OTOLARYNGOLOGY | Facility: CLINIC | Age: 37
End: 2022-09-30

## 2022-09-30 NOTE — TELEPHONE ENCOUNTER
----- Message from CARMELINA Ferreira sent at 9/29/2022  1:04 PM CDT -----  Hearing test and MRI is normal. Recommend masking techniques for the tinnitus (white noise sounds, fan, etc). Use hearing protection when around loud noises. From here we will do a yearly hearing test.

## (undated) DEVICE — STERILE POLYISOPRENE POWDER-FREE SURGICAL GLOVES: Brand: PROTEXIS

## (undated) DEVICE — ADHESIVE SKIN CLSR 0.7ML TOP DERMBND ADV

## (undated) DEVICE — STERILE LATEX POWDER FREE SURGICAL GLOVES WITH HYDROGEL COATING: Brand: PROTEXIS

## (undated) DEVICE — SUTURE VCRL SZ 3-0 L27IN ABSRB UD L26MM SH 1/2 CIR J416H

## (undated) DEVICE — ZIMMER® STERILE DISPOSABLE TOURNIQUET CUFF WITH PLC, DUAL PORT, SINGLE BLADDER, 18 IN. (46 CM)

## (undated) DEVICE — BANDAGE COMPR SGL LAYERED CLP CLSR WHT RED BLK E 162FT LEN

## (undated) DEVICE — SURGICAL PROCEDURE PACK LOWER EXTREMITY LOURDES HOSP

## (undated) DEVICE — BLADE OPHTH 180DEG CUT SURF BLU STR SHRP DBL BVL GRINDLESS